# Patient Record
Sex: MALE | Race: WHITE | URBAN - METROPOLITAN AREA
[De-identification: names, ages, dates, MRNs, and addresses within clinical notes are randomized per-mention and may not be internally consistent; named-entity substitution may affect disease eponyms.]

---

## 2017-04-28 PROBLEM — G47.00 INSOMNIA: Status: ACTIVE | Noted: 2017-04-28

## 2017-04-28 PROBLEM — L30.1 ECZEMA, DYSHIDROTIC: Status: ACTIVE | Noted: 2017-04-28

## 2017-04-28 PROBLEM — F41.9 ANXIETY: Status: ACTIVE | Noted: 2017-04-28

## 2024-01-05 ENCOUNTER — HOSPITAL ENCOUNTER (EMERGENCY)
Age: 39
Discharge: LEFT BEFORE DISCHARGE | End: 2024-01-05

## 2024-01-05 NOTE — ED NOTES
Patient called to triage desk, patient states \"Should I just go to [place]? Someone just said they have been here for 8 hours. My wife works in healthcare. How long is the wait?\". Educated patient that we are not able to give out wait times and that he will likely be seen, treated, and placed in results waiting area. Patient gets up and states \"You are all fucking Jews\" and walked out of the ER.